# Patient Record
Sex: MALE | Race: WHITE | NOT HISPANIC OR LATINO | URBAN - METROPOLITAN AREA
[De-identification: names, ages, dates, MRNs, and addresses within clinical notes are randomized per-mention and may not be internally consistent; named-entity substitution may affect disease eponyms.]

---
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
## 2024-01-01 NOTE — ED PROVIDER NOTES
History  Chief Complaint   Patient presents with   • Syncope     Pt reports syncopal episode this afternoon, +NV x2 since episode, unsure of head strike, was only out for a few seconds, acting appropriately now, just feels weak and shakey      Patient is a 76-year-old male with no previous medical history presents for evaluation of syncope  Patient was standing at home and passed out  No shortness of breath  No abdominal pain  No nausea vomiting  No back pain  No palpitations  History provided by:  Patient  Syncope  Associated symptoms: no anxiety, no chest pain, no confusion, no diaphoresis, no difficulty breathing, no fever, no focal weakness, no headaches, no malaise/fatigue, no nausea, no palpitations, no recent surgery, no seizures, no shortness of breath and no vomiting        None       Past Medical History:   Diagnosis Date   • Auditory processing disorder        History reviewed  No pertinent surgical history  Family History   Problem Relation Age of Onset   • Colon cancer Mother    • Diabetes Mother    • Heart attack Father    • Hypertension Maternal Grandfather      I have reviewed and agree with the history as documented  E-Cigarette/Vaping   • E-Cigarette Use Never User      E-Cigarette/Vaping Substances   • Nicotine No    • THC No    • CBD No    • Flavoring No    • Other No    • Unknown No      Social History     Tobacco Use   • Smoking status: Never   • Smokeless tobacco: Never   Vaping Use   • Vaping Use: Never used   Substance Use Topics   • Alcohol use: Yes     Comment: occasionally   • Drug use: Yes     Types: Marijuana       Review of Systems   Constitutional: Negative for chills, diaphoresis, fever and malaise/fatigue  HENT: Negative for ear pain and sore throat  Eyes: Negative for pain and visual disturbance  Respiratory: Negative for cough and shortness of breath  Cardiovascular: Positive for syncope  Negative for chest pain and palpitations     Gastrointestinal: Negative for abdominal pain, nausea and vomiting  Genitourinary: Negative for dysuria and hematuria  Musculoskeletal: Negative for arthralgias and back pain  Skin: Negative for color change and rash  Neurological: Negative for focal weakness, seizures, syncope and headaches  Psychiatric/Behavioral: Negative for confusion  All other systems reviewed and are negative  Physical Exam  Physical Exam  Vitals and nursing note reviewed  Constitutional:       General: He is not in acute distress  Appearance: He is well-developed  HENT:      Head: Normocephalic and atraumatic  Nose: No congestion or rhinorrhea  Mouth/Throat:      Pharynx: Oropharynx is clear  No oropharyngeal exudate  Eyes:      Conjunctiva/sclera: Conjunctivae normal    Cardiovascular:      Rate and Rhythm: Normal rate and regular rhythm  Heart sounds: No murmur heard  No friction rub  No gallop  Pulmonary:      Effort: Pulmonary effort is normal  No respiratory distress  Breath sounds: Normal breath sounds  No stridor  No wheezing or rales  Abdominal:      General: There is no distension  Palpations: Abdomen is soft  There is no mass  Tenderness: There is no abdominal tenderness  Hernia: No hernia is present  Musculoskeletal:         General: No swelling  Cervical back: Neck supple  No rigidity or tenderness  Lymphadenopathy:      Cervical: No cervical adenopathy  Skin:     General: Skin is warm and dry  Capillary Refill: Capillary refill takes less than 2 seconds  Neurological:      General: No focal deficit present  Mental Status: He is alert and oriented to person, place, and time  Cranial Nerves: No cranial nerve deficit  Sensory: No sensory deficit  Motor: No weakness     Psychiatric:         Mood and Affect: Mood normal          Vital Signs  ED Triage Vitals [06/20/23 1555]   Temperature Pulse Respirations Blood Pressure SpO2   98 °F (36 7 °C) 99 18 132/75 98 %      Temp Source Heart Rate Source Patient Position - Orthostatic VS BP Location FiO2 (%)   Oral Monitor Sitting Right arm --      Pain Score       No Pain           Vitals:    06/20/23 1555   BP: 132/75   Pulse: 99   Patient Position - Orthostatic VS: Sitting         Visual Acuity      ED Medications  Medications - No data to display    Diagnostic Studies  Results Reviewed     Procedure Component Value Units Date/Time    Comprehensive metabolic panel [[id number]]  (Abnormal) Collected: 06/20/23 1653    Lab Status: Final result Specimen: Blood from Arm, Right Updated: 06/20/23 1725     Sodium 140 mmol/L      Potassium 3 4 mmol/L      Chloride 104 mmol/L      CO2 29 mmol/L      ANION GAP 7 mmol/L      BUN 11 mg/dL      Creatinine 0 82 mg/dL      Glucose 96 mg/dL      Calcium 8 9 mg/dL      AST 18 U/L      ALT 21 U/L      Alkaline Phosphatase 40 U/L      Total Protein 6 9 g/dL      Albumin 4 5 g/dL      Total Bilirubin 0 69 mg/dL      eGFR 128 ml/min/1 73sq m     Narrative:      [place] guidelines for Chronic Kidney Disease (CKD):   •  Stage 1 with normal or high GFR (GFR > 90 mL/min/1 73 square meters)  •  Stage 2 Mild CKD (GFR = 60-89 mL/min/1 73 square meters)  •  Stage 3A Moderate CKD (GFR = 45-59 mL/min/1 73 square meters)  •  Stage 3B Moderate CKD (GFR = 30-44 mL/min/1 73 square meters)  •  Stage 4 Severe CKD (GFR = 15-29 mL/min/1 73 square meters)  •  Stage 5 End Stage CKD (GFR <15 mL/min/1 73 square meters)  Note: GFR calculation is accurate only with a steady state creatinine    CBC and differential [[id number]]  (Abnormal) Collected: 06/20/23 1653    Lab Status: Final result Specimen: Blood from Arm, Right Updated: 06/20/23 1704     WBC 4 14 Thousand/uL      RBC 5 06 Million/uL      Hemoglobin 15 3 g/dL      Hematocrit 44 9 %      MCV 89 fL      MCH 30 2 pg      MCHC 34 1 g/dL      RDW 12 4 %      MPV 10 7 fL      Platelets 948 Thousands/uL      nRBC 0 /100 WBCs Neutrophils Relative 60 %      Immat GRANS % 0 %      Lymphocytes Relative 25 %      Monocytes Relative 11 %      Eosinophils Relative 3 %      Basophils Relative 1 %      Neutrophils Absolute 2 52 Thousands/µL      Immature Grans Absolute 0 01 Thousand/uL      Lymphocytes Absolute 1 03 Thousands/µL      Monocytes Absolute 0 44 Thousand/µL      Eosinophils Absolute 0 12 Thousand/µL      Basophils Absolute 0 02 Thousands/µL                  No orders to display              Procedures  Procedures         ED Course              78-year-old male presents for evaluation of syncope  Patient's EKG is unremarkable  His labs are reassuring  He was monitored on telemetry leads with no events  Patient is now comfortable going home  Return precautions provided  Will discharge  Medical Decision Making  78-year-old male presents for evaluation of syncope  Patient's EKG is unremarkable  His labs are reassuring  He was monitored on telemetry leads with no events  Patient is now comfortable going home  Return precautions provided  Will discharge  Amount and/or Complexity of Data Reviewed  External Data Reviewed: labs and radiology  Labs: ordered  Disposition  Final diagnoses:   Syncope     Time reflects when diagnosis was documented in both MDM as applicable and the Disposition within this note     Time User Action Codes Description Comment    6/20/2023  5:53 PM [employer] Citizen Add [R55] Syncope       ED Disposition     ED Disposition   Discharge    Condition   Stable    Date/Time   Tue Jun 20, 2023  5:53 PM    Comment   Wilma Emanuel discharge to home/self care                 Follow-up Information     Follow up With Specialties Details Why Sridhar S Clifton Sam MD Family Medicine Schedule an appointment as soon as possible for a visit in 3 days  [address]  [id number] [facility] [address] [phone]            Patient's Medications    No medications on file No discharge procedures on file      PDMP Review     None          ED Provider  Electronically Signed by           Lianne Dhillon DO  06/20/23 5961 patient representative